# Patient Record
Sex: FEMALE | Race: WHITE | URBAN - METROPOLITAN AREA
[De-identification: names, ages, dates, MRNs, and addresses within clinical notes are randomized per-mention and may not be internally consistent; named-entity substitution may affect disease eponyms.]

---
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
## 2025-01-29 ENCOUNTER — EMPLOYEE HEALTH (OUTPATIENT)
Dept: OCCUPATIONAL MEDICINE | Facility: CLINIC | Age: 20
End: 2025-01-29

## 2025-01-29 ENCOUNTER — EH NON-PROVIDER (OUTPATIENT)
Dept: OCCUPATIONAL MEDICINE | Facility: CLINIC | Age: 20
End: 2025-01-29

## 2025-01-29 ENCOUNTER — HOSPITAL ENCOUNTER (OUTPATIENT)
Facility: MEDICAL CENTER | Age: 20
End: 2025-01-29
Attending: PREVENTIVE MEDICINE
Payer: COMMERCIAL

## 2025-01-29 DIAGNOSIS — Z02.1 PRE-EMPLOYMENT HEALTH SCREENING EXAMINATION: ICD-10-CM

## 2025-01-29 DIAGNOSIS — Z02.89 VISIT FOR OCCUPATIONAL HEALTH EXAMINATION: ICD-10-CM

## 2025-01-29 DIAGNOSIS — Z02.89 VISIT FOR OCCUPATIONAL HEALTH EXAMINATION: Primary | ICD-10-CM

## 2025-01-29 LAB
AMP AMPHETAMINE: NORMAL
BAR BARBITURATES: NORMAL
BZO BENZODIAZEPINES: NORMAL
COC COCAINE: NORMAL
INT CON NEG: NORMAL
INT CON POS: NORMAL
MDMA ECSTASY: NORMAL
MET METHAMPHETAMINES: NORMAL
MTD METHADONE: NORMAL
OPI OPIATES: NORMAL
OXY OXYCODONE: NORMAL
PCP PHENCYCLIDINE: NORMAL
POC URINE DRUG SCREEN OCDRS: NORMAL
THC: NORMAL

## 2025-01-29 NOTE — PROGRESS NOTES
This patient was seen for a pre-employment MA visit.     Company-   Position -     Pre-employment drug screen completed - Yes  Color blindness test - N/A   Quantiferon Gold TB test -Drawn 01/29  Provider Physical - Yes  Mask Fit- N95/CAPR/PAPR -N/A  Spirometry date -n/A     Hand Hygiene form - Yes    Vaccines/Titers     Tdap - Docs  COVID -19-Docs  MMR - Docs   Varicella - Docs  Hepatitis B - Docs  Hepatitis A - N/A   Flu Shot - Yes   Sticker provided - Yes  Specialty or other testing needed at this time -N/A    The patient has been informed about and verbalizes understanding of the applicable pre-employment requirements set by the employer. Yes  The patient has been informed about and verbalizes understanding that they may be required to return to Occupational Health for additional vaccinations or testing. Yes

## 2025-01-30 LAB
GAMMA INTERFERON BACKGROUND BLD IA-ACNC: 0.03 IU/ML
M TB IFN-G BLD-IMP: NEGATIVE
M TB IFN-G CD4+ BCKGRND COR BLD-ACNC: 0 IU/ML
MITOGEN IGNF BCKGRD COR BLD-ACNC: 4.92 IU/ML
QFT TB2 - NIL TBQ2: 0 IU/ML